# Patient Record
Sex: MALE | Race: WHITE | NOT HISPANIC OR LATINO | Employment: OTHER | URBAN - METROPOLITAN AREA
[De-identification: names, ages, dates, MRNs, and addresses within clinical notes are randomized per-mention and may not be internally consistent; named-entity substitution may affect disease eponyms.]

---

## 2019-12-28 VITALS
TEMPERATURE: 98 F | RESPIRATION RATE: 20 BRPM | BODY MASS INDEX: 32.88 KG/M2 | OXYGEN SATURATION: 95 % | SYSTOLIC BLOOD PRESSURE: 130 MMHG | HEART RATE: 83 BPM | WEIGHT: 270 LBS | DIASTOLIC BLOOD PRESSURE: 70 MMHG | HEIGHT: 76 IN

## 2019-12-28 PROCEDURE — 99284 EMERGENCY DEPT VISIT MOD MDM: CPT

## 2019-12-29 ENCOUNTER — HOSPITAL ENCOUNTER (EMERGENCY)
Facility: HOSPITAL | Age: 69
Discharge: HOME | End: 2019-12-29
Attending: EMERGENCY MEDICINE
Payer: MEDICARE

## 2019-12-29 DIAGNOSIS — M25.569 KNEE PAIN, UNSPECIFIED CHRONICITY, UNSPECIFIED LATERALITY: ICD-10-CM
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
## 2019-12-29 DIAGNOSIS — R52 PAIN: ICD-10-CM

## 2019-12-29 LAB
ALBUMIN SERPL BCP-MCNC: 3.7 G/DL (ref 3.5–5.2)
ALP SERPL-CCNC: 67 U/L (ref 55–135)
ALT SERPL W/O P-5'-P-CCNC: 15 U/L (ref 10–44)
ANION GAP SERPL CALC-SCNC: 15 MMOL/L (ref 8–16)
AST SERPL-CCNC: 20 U/L (ref 10–40)
BASOPHILS # BLD AUTO: 0.07 K/UL (ref 0–0.2)
BASOPHILS NFR BLD: 0.6 % (ref 0–1.9)
BILIRUB SERPL-MCNC: 1.2 MG/DL (ref 0.1–1)
BUN SERPL-MCNC: 13 MG/DL (ref 8–23)
CALCIUM SERPL-MCNC: 8.4 MG/DL (ref 8.7–10.5)
CHLORIDE SERPL-SCNC: 85 MMOL/L (ref 95–110)
CO2 SERPL-SCNC: 22 MMOL/L (ref 23–29)
CREAT SERPL-MCNC: 1 MG/DL (ref 0.5–1.4)
CRP SERPL-MCNC: 6.19 MG/DL (ref 0–0.75)
DIFFERENTIAL METHOD: ABNORMAL
EOSINOPHIL # BLD AUTO: 0.3 K/UL (ref 0–0.5)
EOSINOPHIL NFR BLD: 2.2 % (ref 0–8)
ERYTHROCYTE [DISTWIDTH] IN BLOOD BY AUTOMATED COUNT: 16.1 % (ref 11.5–14.5)
ERYTHROCYTE [SEDIMENTATION RATE] IN BLOOD BY WESTERGREN METHOD: 18 MM/HR (ref 0–10)
EST. GFR  (AFRICAN AMERICAN): >60 ML/MIN/1.73 M^2
EST. GFR  (NON AFRICAN AMERICAN): >60 ML/MIN/1.73 M^2
GLUCOSE SERPL-MCNC: 130 MG/DL (ref 70–110)
HCT VFR BLD AUTO: 36.8 % (ref 40–54)
HGB BLD-MCNC: 12.4 G/DL (ref 14–18)
IMM GRANULOCYTES # BLD AUTO: 0.06 K/UL (ref 0–0.04)
IMM GRANULOCYTES NFR BLD AUTO: 0.5 % (ref 0–0.5)
LYMPHOCYTES # BLD AUTO: 1 K/UL (ref 1–4.8)
LYMPHOCYTES NFR BLD: 9.4 % (ref 18–48)
MCH RBC QN AUTO: 31.7 PG (ref 27–31)
MCHC RBC AUTO-ENTMCNC: 33.7 G/DL (ref 32–36)
MCV RBC AUTO: 94 FL (ref 82–98)
MONOCYTES # BLD AUTO: 0.8 K/UL (ref 0.3–1)
MONOCYTES NFR BLD: 7.4 % (ref 4–15)
NEUTROPHILS # BLD AUTO: 8.9 K/UL (ref 1.8–7.7)
NEUTROPHILS NFR BLD: 79.9 % (ref 38–73)
NRBC BLD-RTO: 0 /100 WBC
PLATELET # BLD AUTO: 201 K/UL (ref 150–350)
PMV BLD AUTO: 9.5 FL (ref 9.2–12.9)
POTASSIUM SERPL-SCNC: 3.7 MMOL/L (ref 3.5–5.1)
PROT SERPL-MCNC: 7 G/DL (ref 6–8.4)
RBC # BLD AUTO: 3.91 M/UL (ref 4.6–6.2)
SODIUM SERPL-SCNC: 122 MMOL/L (ref 136–145)
WBC # BLD AUTO: 11.12 K/UL (ref 3.9–12.7)

## 2019-12-29 PROCEDURE — 86140 C-REACTIVE PROTEIN: CPT

## 2019-12-29 PROCEDURE — 85025 COMPLETE CBC W/AUTO DIFF WBC: CPT

## 2019-12-29 PROCEDURE — 80053 COMPREHEN METABOLIC PANEL: CPT

## 2019-12-29 PROCEDURE — 36415 COLL VENOUS BLD VENIPUNCTURE: CPT

## 2019-12-29 PROCEDURE — 85651 RBC SED RATE NONAUTOMATED: CPT

## 2019-12-29 NOTE — ED NOTES
Discharge instructions, diagnosis, medications, and follow up discussed with patient. Patient verbalized understanding. All questions and concerns answered. No needs expressed at the time. Pt is awake, alert and oriented with no acute distress noted. Respirations even and unlabored. Pt assisted out of ED via wheelchair.

## 2020-01-06 NOTE — ED PROVIDER NOTES
Encounter Date: 12/28/2019       History     Chief Complaint   Patient presents with    Leg Pain     pt c/o bilateral leg numbness since multiple previous knee surgeries. pt fell at home. denies any trauma.     HPI     Seen and evaluated.  Presented chief complaint leg pain.  Multiple previous surgeries.  Had a mechanical fall.  Right knee is warm swollen and tender to palpation.  There is an effusion in the right knee.    Review of patient's allergies indicates:  No Known Allergies  No past medical history on file.  No past surgical history on file.  No family history on file.  Social History     Tobacco Use    Smoking status: Not on file   Substance Use Topics    Alcohol use: Not on file    Drug use: Not on file     Review of Systems   Constitutional: Negative for fever.   HENT: Negative for sore throat.    Respiratory: Negative for shortness of breath.    Cardiovascular: Negative for chest pain.   Gastrointestinal: Negative for nausea.   Genitourinary: Negative for dysuria.   Musculoskeletal: Positive for arthralgias and joint swelling. Negative for back pain.   Neurological: Negative for weakness.   All other systems reviewed and are negative.      Physical Exam     Initial Vitals [12/28/19 2251]   BP Pulse Resp Temp SpO2   130/70 83 20 98.1 °F (36.7 °C) 95 %      MAP       --         Physical Exam    Nursing note and vitals reviewed.  Constitutional: Vital signs are normal. He appears well-developed and well-nourished.   HENT:   Head: Normocephalic and atraumatic.   Eyes: Conjunctivae are normal.   Neck: Neck supple.   Cardiovascular: Normal rate and regular rhythm.   Abdominal: Soft. Normal appearance.   Musculoskeletal: He exhibits edema and tenderness.   Swollen knee with tenderness and noted effusion, some erythema noted.   Neurological: He is alert and oriented to person, place, and time.   Skin: Skin is warm and dry.   Psychiatric: He has a normal mood and affect.         ED Course   Procedures  Labs  Reviewed   CBC W/ AUTO DIFFERENTIAL - Abnormal; Notable for the following components:       Result Value    RBC 3.91 (*)     Hemoglobin 12.4 (*)     Hematocrit 36.8 (*)     Mean Corpuscular Hemoglobin 31.7 (*)     RDW 16.1 (*)     Gran # (ANC) 8.9 (*)     Immature Grans (Abs) 0.06 (*)     Gran% 79.9 (*)     Lymph% 9.4 (*)     All other components within normal limits   COMPREHENSIVE METABOLIC PANEL - Abnormal; Notable for the following components:    Sodium 122 (*)     Chloride 85 (*)     CO2 22 (*)     Glucose 130 (*)     Calcium 8.4 (*)     Total Bilirubin 1.2 (*)     All other components within normal limits   SEDIMENTATION RATE - Abnormal; Notable for the following components:    Sed Rate 18 (*)     All other components within normal limits   C-REACTIVE PROTEIN - Abnormal; Notable for the following components:    CRP 6.19 (*)     All other components within normal limits          Imaging Results          X-Ray Foot Complete Right (Final result)  Result time 12/29/19 07:36:15    Final result by Bertram Jones MD (12/29/19 07:36:15)                 Impression:      Negative for acute fracture or dislocation.      Electronically signed by: Bertram Jones MD  Date:    12/29/2019  Time:    07:36             Narrative:    EXAMINATION:  XR FOOT COMPLETE 3 VIEW RIGHT    CLINICAL HISTORY:  Right foot pain post trauma sustained in a fall.    FINDINGS:  Three views of the right foot show no definite acute fracture, dislocation, or destructive osseous lesion.  There is degenerative joint space narrowing with osteophytes, with the bones mildly osteopenic.  There are diffuse peripheral arterial vascular calcifications, with linear metallic foreign body at the level of the distal 3rd metatarsal.                               X-Ray Tibia Fibula 2 View Right (Final result)  Result time 12/29/19 07:33:30    Final result by Bertram Jones MD (12/29/19 07:33:30)                 Impression:      Negative for acute fracture or  dislocation.      Electronically signed by: Bertram Jones MD  Date:    12/29/2019  Time:    07:33             Narrative:    EXAMINATION:  XR TIBIA FIBULA 2 VIEW RIGHT    CLINICAL HISTORY:  Fall with right lower leg pain and swelling.    FINDINGS:  Two views of the right tibia and fibula show no acute fracture or dislocation.  The bones appear mildly osteopenic, with diffuse peripheral arterial vascular calcifications.                               X-Ray Knee Complete 4 or more Views Right (Final result)  Result time 12/29/19 07:25:45   Procedure changed from X-Ray Knee 3 View Right     Final result by Bertram Jones MD (12/29/19 07:25:45)                 Impression:      Please see findings.      Electronically signed by: Bertram Jones MD  Date:    12/29/2019  Time:    07:25             Narrative:    EXAMINATION:  XR KNEE COMP 4 OR MORE VIEWS RIGHT    CLINICAL HISTORY:  Right knee pain post trauma sustained in a fall.    FINDINGS:  Four views of the right knee with no prior studies for comparison show postoperative changes of previous total knee arthroplasty revision, with the femoral and tibial cemented components well seated and in anatomic alignment.    There is osseous fragmentation of the superior patellar pole, nonspecific, with lateral patellar subluxation with respect to the trochlear portion of the femoral component.  Avulsion fracture of the superior patellar pole is not excluded. There is diffuse soft tissue swelling about the knee, with diffuse peripheral arterial vascular calcifications.                                 Medical Decision Making:   Initial Assessment:   Discussed care with the patient.  Plan was further evaluation treatment given elevated inflammatory markers and concern for possible infectious process.  Discussed this with the patient was adamant that he wanted to be discharged.  Initially was going to [facility], he agreed to follow up with his primary doctor at home and was comfortable  leaving and accepted the risks.  He also had abnormal laboratories which she attributed to his home medication regimen.  In particularly he thought his hyponatremia could be related to his diuretic after further evaluation treatment the patient declined. I agreed discharged to follow up with his primary physician at home.  He is not from this area and given complexity of leg care, would likely be better if followed by his home surgeon.  Additionally, following the emergency department visit, contact the patient's wife to further discuss the radiology read.  There was concern about the possibility of patellar subluxation, the patient is ambulatory and she was made aware of these findings as well as my concern about infection.  They agree to follow with her primary orthopedist at home.                                 Clinical Impression:       ICD-10-CM ICD-9-CM   1. Fall, initial encounter W19.XXXA E888.9   2. Pain R52 780.96   3. Knee pain, unspecified chronicity, unspecified laterality M25.569 719.46                             Kristian Johnson Jr., MD  01/05/20 2139       Kristian Johnson Jr., MD  01/06/20 0121

## 2022-07-09 NOTE — DISCHARGE INSTRUCTIONS
He had elevated inflammatory markers including ER C reactive protein and sedimentation rate.  While nonspecific these can be associated with infection.  We recommend he follow closely with your orthopedist as you have elected to leave here prior to further evaluation of your knee for infection.  
Droplet+Contact precautions